# Patient Record
(demographics unavailable — no encounter records)

---

## 2025-07-14 NOTE — HISTORY OF PRESENT ILLNESS
[de-identified] : 62 year old RHD man who comes in for evaluation for LEFT shoulder pain that started June 7, 2025 when he fell on 2 steps on outstretched left hand.  He was on Eliquis at the time for DVT following a vein surgery.  Now he is on Plavix. When he fell he injured his left wrist.  He thought it would get better but saw Dr. Jones her last week in the hand center diagnosed him with a triquetral fracture and he is in a wrist splint. He also was having left shoulder pain which he thought might be initially related to the wrist but has been ongoing and was referred for evaluation.  His pain is up to a 6 out of 10 intermittently with certain movements like raising his arm or turning the arm.  Pain can be in the outer shoulder and shoulder blade area.  No prior shoulder problems

## 2025-07-14 NOTE — PHYSICAL EXAM
[UE] : Sensory: Intact in bilateral upper extremities [Normal RUE] : Right Upper Extremity: No scars, rashes, lesions, ulcers, skin intact [Normal LUE] : Left Upper Extremity: No scars, rashes, lesions, ulcers, skin intact [Normal Touch] : sensation intact for touch [Normal] : Gait: normal [de-identified] : LEFT shoulder No edema, ecchymoses, erythema, deformity. Mildly tender at the posterolateral shoulder.  Nontender AC joint and clavicle and proximal humerus. Left shoulder active range of motion is with a painful arc and slight shoulder shrug with about 165 degrees forward elevation mildly decreased.  Internal rotation is to L1 with pain mildly. With the arm at the side there is about 80 degrees external rotation passively. Positive Ne[employer] and [employer] with 5 -/5 supraspinatus and 5/5 internal and external rotation and biceps. Motor and sensation intact distally.  He had a Velcro splint on.  The straps were somewhat tight and I recommended loosening slightly [de-identified] :   X-rays ordered, performed and reviewed today of LEFT shoulder AP, Y lateral and axillary views showed osteophyte inferior humeral head and mild narrowing glenohumeral joint consistent with osteoarthritis of the glenohumeral joint.  Osteoarthritis at the AC joint.  No elevation humeral head

## 2025-07-14 NOTE — HISTORY OF PRESENT ILLNESS
[de-identified] : 62 year old RHD man who comes in for evaluation for LEFT shoulder pain that started June 7, 2025 when he fell on 2 steps on outstretched left hand.  He was on Eliquis at the time for DVT following a vein surgery.  Now he is on Plavix. When he fell he injured his left wrist.  He thought it would get better but saw Dr. Jones her last week in the hand center diagnosed him with a triquetral fracture and he is in a wrist splint. He also was having left shoulder pain which he thought might be initially related to the wrist but has been ongoing and was referred for evaluation.  His pain is up to a 6 out of 10 intermittently with certain movements like raising his arm or turning the arm.  Pain can be in the outer shoulder and shoulder blade area.  No prior shoulder problems

## 2025-07-14 NOTE — PHYSICAL EXAM
[UE] : Sensory: Intact in bilateral upper extremities [Normal RUE] : Right Upper Extremity: No scars, rashes, lesions, ulcers, skin intact [Normal LUE] : Left Upper Extremity: No scars, rashes, lesions, ulcers, skin intact [Normal Touch] : sensation intact for touch [Normal] : Gait: normal [de-identified] : LEFT shoulder No edema, ecchymoses, erythema, deformity. Mildly tender at the posterolateral shoulder.  Nontender AC joint and clavicle and proximal humerus. Left shoulder active range of motion is with a painful arc and slight shoulder shrug with about 165 degrees forward elevation mildly decreased.  Internal rotation is to L1 with pain mildly. With the arm at the side there is about 80 degrees external rotation passively. Positive Ne[employer] and [employer] with 5 -/5 supraspinatus and 5/5 internal and external rotation and biceps. Motor and sensation intact distally.  He had a Velcro splint on.  The straps were somewhat tight and I recommended loosening slightly [de-identified] :   X-rays ordered, performed and reviewed today of LEFT shoulder AP, Y lateral and axillary views showed osteophyte inferior humeral head and mild narrowing glenohumeral joint consistent with osteoarthritis of the glenohumeral joint.  Osteoarthritis at the AC joint.  No elevation humeral head   SCDs in setting of GIB    11. Dispo: PT consulted to eval/tx for deconditioning. dc home w/ home PT  12. Home gabapentin for neuropathy (s/p work injury)    IMPROVE VTE Individual Risk Assessment       RISK                                                          Points  [  ] Previous VTE                                                3  [  ] Thrombophilia                                             2  [  ] Lower limb paralysis                                   2        (unable to hold up >15 seconds)    [  ] Current Cancer                                             2         (within 6 months)  [  ] Immobilization > 24 hrs                              1  [  ] ICU/CCU stay > 24 hours                             1  [ 1 ] Age > 60                                                         1    IMPROVE VTE Score:         [       1  ] - Xarelto 20mg qD restarted    11. Home gabapentin for neuropathy (s/p work injury)

## 2025-07-14 NOTE — ASSESSMENT
[FreeTextEntry1] : 63 y/o right-hand-dominant male with left shoulder pain that started about 5 to 6 weeks ago when he fell on his left outstretched hand. He has a triquetral fracture in the wrist. He has underlying left glenohumeral joint arthritis and may have strained the rotator cuff.  No fractures were seen.  I recommended relative rest avoiding activities that cause pain.  He was given home exercises to work on assisted motion and once it is feeling better progressive strengthening.  In a couple weeks he could start some formal physical therapy. Tylenol if needed for pain. Follow-up in 4 weeks to check progress.  If he is having ongoing symptoms then I would likely refer him for an MRI and further workup. He is going to be away at the end of August for several weeks